# Patient Record
Sex: MALE | Race: WHITE | NOT HISPANIC OR LATINO | Employment: OTHER | URBAN - METROPOLITAN AREA
[De-identification: names, ages, dates, MRNs, and addresses within clinical notes are randomized per-mention and may not be internally consistent; named-entity substitution may affect disease eponyms.]

---

## 2018-01-09 ENCOUNTER — OFFICE VISIT (OUTPATIENT)
Dept: ALLERGY | Facility: CLINIC | Age: 48
End: 2018-01-09
Payer: COMMERCIAL

## 2018-01-09 VITALS
SYSTOLIC BLOOD PRESSURE: 160 MMHG | WEIGHT: 219.38 LBS | BODY MASS INDEX: 29.71 KG/M2 | HEIGHT: 72 IN | DIASTOLIC BLOOD PRESSURE: 100 MMHG

## 2018-01-09 DIAGNOSIS — J31.0 CHRONIC RHINITIS, UNSPECIFIED TYPE: ICD-10-CM

## 2018-01-09 DIAGNOSIS — J00 ACUTE RHINITIS, UNSPECIFIED TYPE: Primary | ICD-10-CM

## 2018-01-09 PROCEDURE — 99204 OFFICE O/P NEW MOD 45 MIN: CPT | Mod: S$GLB,,, | Performed by: STUDENT IN AN ORGANIZED HEALTH CARE EDUCATION/TRAINING PROGRAM

## 2018-01-09 PROCEDURE — 99999 PR PBB SHADOW E&M-NEW PATIENT-LVL III: CPT | Mod: PBBFAC,,, | Performed by: STUDENT IN AN ORGANIZED HEALTH CARE EDUCATION/TRAINING PROGRAM

## 2018-01-09 RX ORDER — FLUTICASONE PROPIONATE 50 MCG
2 SPRAY, SUSPENSION (ML) NASAL DAILY
Qty: 1 BOTTLE | Refills: 3 | Status: SHIPPED | OUTPATIENT
Start: 2018-01-09 | End: 2018-02-08

## 2018-01-09 RX ORDER — LORATADINE 10 MG/1
10 TABLET ORAL DAILY
COMMUNITY

## 2018-01-09 NOTE — PROGRESS NOTES
ALLERGY & IMMUNOLOGY CLINIC - INITIAL CONSULTATION     HISTORY OF PRESENT ILLNESS     Patient ID: Terrence Simon is a 47 y.o. male    CC: rhinitis and snoring    HPI: This is 46 yo male with h/o rhinitis presented here for evaluation. He sates that he has nasal congestion and post nasal drip since young. Never been allergy skin tested. Has used nasal spray saline and claritin D. Etc. He c/o flare up of symptoms twice a year. Lasted for 1 month, from oct to December, and from march and April No specific triggers. Both indoors and outdoors.  It occurs usu after 7 pm, and morning.  Now started from john, c/o nasal congestion, post nasal drip, not much runny nose, almost every day. He took 3 days of claritin, salt nasal irrigation, he thinks it helped his nasal congestion.  OTC nasal spray.     Snoring: c/o snoring all year round. Hard to breath at night. He thinks it is related with allergy.    Atoptic Hx: denies asthma, food allergy, medication allergy, insect stings, eczema    Dust mite avoidance measures: no   there are rugs in the house,  No molds no leakage in the house.  Family Hx:  His mother has severe asthma, no Kids, lives with his wife, used to have 2 cats.     REVIEW OF SYSTEMS     CONST: no F/C/NS, no unintentional weight changes  NEURO: no H/A, no weakness, no paresthesias  EYES: no discharge, no pruritus, no erythema  EARS: no hearing loss, no sensation of fullness  NOSE:see HPI  PULM: no SOB, no wheezing, no cough, no snoring  CV: no CP, no palpitations, no leg swelling  GI: no dysphagia, no heartburn, no pain, no N/V/D, no BRBPR/melena  URO: no dysuria, no hematuria, no nocturia  MSK: no joint pain, no muscle pain  DERM: no rashes, no skin breaks     MEDICAL HISTORY     MedHx: active problems reviewed self employed,   SurgHx: denies,  SocHx: never smoke, drink wine.  FamHx: no significant family hx  Allergies: see below  Medications: MAR reviewed  Vaccines: not flu vaccine    H/o Asthma:  "denies  H/o Eczema: denies  Oral Allergy:  denies  Food Allergy: denies  Venom Allergy: denies  Latex Allergy: denies  Other Allergies: denies  Env/Occ: denies any evironmental or occupational exposures     PHYSICAL EXAM     VS: BMI: 30.17 kg/m² 99.5 kg (219 lb 5.7 oz), 5' 11.5" (1.816 m) (!) 160/100  GENERAL: AAOx4, NAD, well-appearing, cooperative  EYES: PERRL, EOMI, no conjunctival injection, no discharge, no infraorbital shiners  EARS: external auditory canals normal B/L, TM normal B/L  NOSE: NT 3+ and  B/L,  + stringing mucous, no polyps  ORAL: MMM, no ulcers, no thrush, no cobblestoning  NECK: supple, trachea midline, no thyromegaly, no LAD  LUNGS: CTAB, no w/r/c, no increased WOB  HEART: RRR, normal S1/S2, no m/g/r  ABDOMEN: BS present, soft, non-tender, non-distended, no HSM  EXTREMITIES: +2 distal pulses, no c/c/e  LYMPHATICS: no cervical/submandibular LAD, no axillary LAD, no inguinal LAD  DERM: no rashes, no skin breaks, no dystrophic fingernails  NEURO: normal gait, no facial asymmetry       IMAGING & OTHER DIAGNOSTICS     NA       ASSESSMENT & PLAN     Terrence Simon is a 47 y.o. male with     Chronic rhinitis: with seasonal changes c/o symptoms since young. Sounded like allergic rhinitis symptoms.   -to use flonase nasal spray 2 spray each nostril twice a day   -to use claritin daily   -to schedule him for skin prick test with inhalants in 4 weeks   -to take off anti histamines 7 days before the appointment.    Snoring: Snoring at night.   -to use flonase nasal spray 2 spray each nostril twice a day   -If it is not improved, to see sleep medicine.    Hypertension: BP today in clinic 160/100mmHg   -to avoid nasal decongestants such as pseudoephiderine.   -to monitor his BP with regular measuring. If it is elevated in 2 occasion in different time, to see PCP for anti hypertensives.    Follow up: 4 weeks for skin prick test with inhalants    Lucy patton MD  Allergy/Immunology Fellow  "

## 2018-01-09 NOTE — PATIENT INSTRUCTIONS
1. To schedule for skin prick test in 4 weeks.  2.to take off anti-histamines for 7 days before the appointment.  3.to take flonase nasal spray 2 spray each nostril twice daily.

## 2018-02-06 ENCOUNTER — OFFICE VISIT (OUTPATIENT)
Dept: ALLERGY | Facility: CLINIC | Age: 48
End: 2018-02-06
Payer: COMMERCIAL

## 2018-02-06 DIAGNOSIS — J31.0 CHRONIC RHINITIS, UNSPECIFIED TYPE: Primary | ICD-10-CM

## 2018-02-06 DIAGNOSIS — R06.83 SNORING: ICD-10-CM

## 2018-02-06 PROCEDURE — 99499 UNLISTED E&M SERVICE: CPT | Mod: S$GLB,,, | Performed by: STUDENT IN AN ORGANIZED HEALTH CARE EDUCATION/TRAINING PROGRAM

## 2018-02-06 PROCEDURE — 95004 PERQ TESTS W/ALRGNC XTRCS: CPT | Mod: S$GLB,,, | Performed by: STUDENT IN AN ORGANIZED HEALTH CARE EDUCATION/TRAINING PROGRAM

## 2018-02-06 PROCEDURE — 99999 PR PBB SHADOW E&M-EST. PATIENT-LVL I: CPT | Mod: PBBFAC,,, | Performed by: STUDENT IN AN ORGANIZED HEALTH CARE EDUCATION/TRAINING PROGRAM

## 2018-02-06 NOTE — PROGRESS NOTES
"HISTORY OF PRESENT ILLNESS      Patient ID: Terrence Simon is a 47 y.o. male     CC: follow up skin prick testing for chronic rhinitis     HPI: This is 48 yo male with h/o rhinitis presented here for skin prick testing with inhalants. He started using flonase nasal spray 1SEN at night. He states that his rhinitis symptoms of nasal congestion, post nasal drip, nasal blockage are improved with flonase nasal spray. He also takes claritin every day. He stopped claritin 7 days before the appointment.      Snoring: c/o snoring all year round.  His breathing improve with flonase nasal spray. But still c/o snoring.     Atoptic Hx: denies asthma, food allergy, medication allergy, insect stings, eczema     Dust mite avoidance measures: no   there are rugs in the house,  No molds no leakage in the house.  Family Hx:  His mother has severe asthma, no Kids, lives with his wife, used to have 2 cats.      REVIEW OF SYSTEMS      CONST: no F/C/NS, no unintentional weight changes  NEURO: no H/A, no weakness, no paresthesias  EYES: no discharge, no pruritus, no erythema  EARS: no hearing loss, no sensation of fullness  NOSE:see HPI  PULM: no SOB, no wheezing, no cough, no snoring  CV: no CP, no palpitations, no leg swelling  GI: no dysphagia, no heartburn, no pain, no N/V/D, no BRBPR/melena  URO: no dysuria, no hematuria, no nocturia  MSK: no joint pain, no muscle pain  DERM: no rashes, no skin breaks      MEDICAL HISTORY      MedHx: active problems reviewed self employed,   SurgHx: denies,  SocHx: never smoke, drink wine.  FamHx: no significant family hx  Allergies: see below  Medications: MAR reviewed  Vaccines: not flu vaccine     H/o Asthma: denies  H/o Eczema: denies  Oral Allergy:  denies  Food Allergy: denies  Venom Allergy: denies  Latex Allergy: denies  Other Allergies: denies  Env/Occ: denies any evironmental or occupational exposures      PHYSICAL EXAM      VS: BMI: 30.17 kg/m² 99.5 kg (219 lb 5.7 oz), 5' 11.5" " (1.816 m)  Blood pressure is not measured because patient refused.    GENERAL: AAOx4, NAD, well-appearing, cooperative  EYES: PERRL, EOMI, no conjunctival injection, no discharge, no infraorbital shiners  EARS: external auditory canals normal B/L, TM normal B/L  NOSE: NT 3+ and  B/L,  + stringing mucous, no polyps  ORAL: MMM, no ulcers, no thrush, no cobblestoning  NECK: supple, trachea midline, no thyromegaly, no LAD  LUNGS: CTAB, no w/r/c, no increased WOB  HEART: RRR, normal S1/S2, no m/g/r  ABDOMEN: BS present, soft, non-tender, non-distended, no HSM  EXTREMITIES: +2 distal pulses, no c/c/e  LYMPHATICS: no cervical/submandibular LAD, no axillary LAD, no inguinal LAD  DERM: erythematous macular scaly rash noticed on his lower back which he stated he has it for 8 years.  NEURO: normal gait, no facial asymmetry         IMAGING & OTHER DIAGNOSTICS      NA     Skin Prick testing:Today. To see attached note for skin prick testing result.      ASSESSMENT & PLAN      Terrence Simon is a 47 y.o. male with      Chronic rhinitis:improved with flonase nasal spray. Skin prick testing with inhalants today are negative. Non-allergic rhinitis   -to continue flonase nasal spray, can increase the dose of flonase nasal spray to 2 SEN twice daily   -can stop claritin.                Snoring: Snoring at night.              -to use flonase nasal spray 2 spray each nostril twice a day              -To follow up with sleep medicine for sleep study if he still has snoring.     Hypertension: BP in last visit was 160/100mmHg. Suggested to follow up BP measurement. Patient refused to get measurement of BP.              Follow up: as needed      Silver Galindo M.D  Allergy/immunology Fellow

## 2018-03-15 ENCOUNTER — TELEPHONE (OUTPATIENT)
Dept: ALLERGY | Facility: CLINIC | Age: 48
End: 2018-03-15

## 2018-03-15 DIAGNOSIS — G47.10 EXCESSIVE SOMNOLENCE DISORDER: Primary | ICD-10-CM

## 2018-03-15 NOTE — TELEPHONE ENCOUNTER
Spoke with patient in reference to issues with allergies. Also asked Dr. Bran to assist with conversation with included her referring patient to a sleep study per his request----- Message from Rachel Ackerman sent at 3/14/2018  5:16 PM CDT -----  Contact: Pt can be reached at [phone]  Pt is calling to requesting order and referral for sleep study.      Thank you!

## 2018-03-15 NOTE — TELEPHONE ENCOUNTER
Spoke with patient, sleep study referred per Dr. Bran----- Message from Rachel Ackerman sent at 3/14/2018  5:19 PM CDT -----  Contact: Pt can be reached at [phone]  Pt is requesting referral for sleep study at [facility]  Phone [phone]  Fax [phone] this is an at home sleep study.    Phone [phone]  [facility]      Thank you!